# Patient Record
Sex: FEMALE | Race: WHITE
[De-identification: names, ages, dates, MRNs, and addresses within clinical notes are randomized per-mention and may not be internally consistent; named-entity substitution may affect disease eponyms.]

---

## 2018-03-19 ENCOUNTER — HOSPITAL ENCOUNTER (EMERGENCY)
Dept: HOSPITAL 43 - DL.ED | Age: 68
Discharge: HOME | End: 2018-03-19
Payer: MEDICARE

## 2018-03-19 VITALS — DIASTOLIC BLOOD PRESSURE: 89 MMHG | SYSTOLIC BLOOD PRESSURE: 138 MMHG
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
## 2018-03-19 DIAGNOSIS — F17.210: ICD-10-CM

## 2018-03-19 DIAGNOSIS — Z79.82: ICD-10-CM

## 2018-03-19 DIAGNOSIS — S20.212A: Primary | ICD-10-CM

## 2018-03-19 DIAGNOSIS — Z88.1: ICD-10-CM

## 2018-03-19 DIAGNOSIS — S50.02XA: ICD-10-CM

## 2018-03-19 DIAGNOSIS — Z79.899: ICD-10-CM

## 2018-03-19 NOTE — CR
Clinical history: 67-year-old female injured fall.

 

Interpretation: Tiny bone spur olecranon process proximal ulna.

 

No sign of left elbow joint effusion, left elbow fracture or dislocation.

## 2018-03-19 NOTE — CR
Clinical history: 67-year-old female injured left chest (ribs) in a fall.

 

Interpretation: 

 

Normal cardiac silhouette without cephalization of vascular flow, signs of alveolar edema or dependen
t effusion.

 

Mild dorsolumbar scoliosis, generalized osteopenia and multilevel disc disease with arthritic spondyl
osis of the dorsal spine.

 

No sign of left rib fracture, underlying lung contusion, ipsilateral dependent pleural effusion or le
ft-sided pneumothorax (BB marker over the seventh rib on the left).

 

No lung mass, hilar lymphadenopathy or focal lobar pneumonia.

 

CONCLUSION: Chronic abnormalities dorsal spine. No left rib fracture. 

No new cardiopulmonary abnormality since 18 May 2009 exam.

## 2018-03-19 NOTE — EDM.PDOC
ED HPI GENERAL MEDICAL PROBLEM





- General


Chief Complaint: Upper Extremity Injury/Pain


Stated Complaint: FELL ON ICE, HURT LEFT ELBOW AND RIBS


Time Seen by Provider: 03/19/18 15:50


Source of Information: Reports: Patient


History Limitations: Reports: No Limitations





- History of Present Illness


INITIAL COMMENTS - FREE TEXT/NARRATIVE: 





This 66 yo female patient reports to the ED due to a ground level fall, pain in 

her left elbow and pain in her left ribs. The patient reports she slipped on 

the ice this morning and hit her elbow. The patient reports she placed a 

bandage over her left elbow, but continues to have some bleeding. The patient 

reports increased left rib pain with breathing, sneezing or coughing. The 

patient reports no similar previous injuries. The patient reports she had no 

loss of consciousness before, during or after the fall. 


Onset: Today


Onset Date: 03/19/18


Duration: Constant


Location: Reports: Chest (left rib), Upper Extremity, Left (left elbow)


Quality: Reports: Ache, Sharp, Stabbing


Severity: Moderate


Improves with: Reports: Rest


Worsens with: Reports: Movement


Context: Reports: Trauma (fall)


Associated Symptoms: Reports: Chest Pain (left rib pain)


  ** Left Flank


Pain Score (Numeric/FACES): 5





- Related Data


 Allergies











Allergy/AdvReac Type Severity Reaction Status Date / Time


 


amoxicillin trihydrate Allergy  Rash Verified 05/31/16 13:29





[From Augmentin]     


 


potassium clavulanate Allergy  Rash Verified 05/31/16 13:29





[From Augmentin]     











Home Meds: 


 Home Meds





Alendronate [Fosamax] 70 mg PO ASDIRECTED 05/31/16 [History]


Aspirin [Low Dose Aspirin EC] 81 mg PO DAILY 05/31/16 [History]


Aspirin/Acetaminophen/Caffeine [Aspirin-Acetaminophen-Caff Tab] 1 tab PO 

ASDIRECTED PRN 05/31/16 [History]


Budesonide/Formoterol [Symbicort 160-4.5 MCG] 2 inh INH DAILY 05/31/16 [History]


Carboxymethylcellulose Sodium [Restore Tears] 1 drop EYEBOTH DAILY 05/31/16 [

History]


DULoxetine HCl [Cymbalta] 60 mg PO DAILY 05/31/16 [History]


Vitamin B Complex [B Complex] 1 tab PO DAILY 05/31/16 [History]


Vitamin E 400 unit PO DAILY 05/31/16 [History]


Krill/Omega-3/Dha/Epa/Lipids [Krill Oil 300 mg Softgel] 1 tab PO DAILY 06/01/16 

[History]











Past Medical History


HEENT History: Reports: Impaired Vision





- Past Surgical History


Female  Surgical History: Reports: Hysterectomy, Tubal Ligation





Social & Family History





- Tobacco Use


Smoking Status *Q: Current Every Day Smoker


Years of Tobacco use: 50


Packs/Tins Daily: 1





- Caffeine Use


Caffeine Use: Reports: Coffee, Soda





Review of Systems





- Review of Systems


Review Of Systems: ROS reveals no pertinent complaints other than HPI.





ED EXAM, GENERAL





- Physical Exam


Exam: See Below


Exam Limited By: No Limitations


General Appearance: Alert, WD/WN, Moderate Distress


Eye Exam: Bilateral Eye: EOMI, Normal Inspection, PERRL


Ears: Normal External Exam, Normal Canal, Hearing Grossly Normal, Normal TMs


Nose: Normal Inspection, Normal Mucosa, No Blood


Throat/Mouth: Normal Inspection, Normal Lips, Normal Teeth, Normal Gums, Normal 

Oropharynx, Normal Voice, No Airway Compromise


Head: Atraumatic, Normocephalic


Neck: Normal Inspection, Supple, Non-Tender, Full Range of Motion


Respiratory/Chest: No Respiratory Distress, Lungs Clear, Normal Breath Sounds, 

No Accessory Muscle Use, Other (left rib tenderness to palpation (mid left 

sided rib pain) )


Cardiovascular: Normal Peripheral Pulses, Regular Rate, Rhythm, No Edema, No 

Gallop, No JVD, No Murmur, No Rub


GI/Abdominal: Normal Bowel Sounds, Soft, Non-Tender, No Organomegaly, No 

Distention, No Abnormal Bruit, No Mass


 (Female) Exam: Deferred


Rectal (Female) Exam: Deferred


Back Exam: Normal Inspection, Full Range of Motion, NT


Extremities: Arm Pain (left elbow pain (abrasion with bleeding controlled)


Neurological: Alert, Oriented, CN II-XII Intact, Normal Cognition, Normal Gait, 

Normal Reflexes, No Motor/Sensory Deficits


Psychiatric: Normal Affect, Normal Mood


Skin Exam: Warm, Dry, Intact, Normal Color, No Rash


Lymphatic: No Adenopathy





Course





- Vital Signs


Last Recorded V/S: 


 Last Vital Signs











Temp  36.6 C   03/19/18 15:04


 


Pulse  76   03/19/18 15:04


 


Resp  16   03/19/18 15:04


 


BP  138/89   03/19/18 15:04


 


Pulse Ox  99   03/19/18 15:04














Departure





- Departure


Time of Disposition: 16:55


Disposition: Home, Self-Care 01


Condition: Fair


Clinical Impression: 


 Fall from ground level





Contusion of rib on left side


Qualifiers:


 Encounter type: initial encounter Qualified Code(s): S20.212A - Contusion of 

left front wall of thorax, initial encounter





Left elbow contusion


Qualifiers:


 Encounter type: initial encounter Qualified Code(s): S50.02XA - Contusion of 

left elbow, initial encounter








- Discharge Information


Instructions:  Rib Contusion, Elbow Contusion, Easy-to-Read


Forms:  ED Department Discharge


Care Plan Goals: 


The patient was advised of the examination and x-ray results during the visit. 

The patient was encouraged to take over the counter medications as directed for 

temporary symptom relief. The patient should rest, ice and elevate her left 

elbow. If the patient has any additional symptoms or concerns, the patient 

should follow-up with her primary care facility or return to the emergency 

department.

## 2018-07-18 ENCOUNTER — HOSPITAL ENCOUNTER (OUTPATIENT)
Dept: HOSPITAL 43 - DL.SDS | Age: 68
Discharge: HOME | End: 2018-07-18
Attending: OPHTHALMOLOGY
Payer: MEDICARE

## 2018-07-18 VITALS — DIASTOLIC BLOOD PRESSURE: 85 MMHG | SYSTOLIC BLOOD PRESSURE: 145 MMHG

## 2018-07-18 DIAGNOSIS — F17.210: ICD-10-CM

## 2018-07-18 DIAGNOSIS — E78.5: ICD-10-CM

## 2018-07-18 DIAGNOSIS — Z79.899: ICD-10-CM

## 2018-07-18 DIAGNOSIS — I10: ICD-10-CM

## 2018-07-18 DIAGNOSIS — F32.9: ICD-10-CM

## 2018-07-18 DIAGNOSIS — H25.812: Primary | ICD-10-CM

## 2018-07-18 DIAGNOSIS — F41.9: ICD-10-CM

## 2018-07-18 DIAGNOSIS — Z88.0: ICD-10-CM

## 2018-07-18 DIAGNOSIS — Z79.82: ICD-10-CM

## 2018-07-18 DIAGNOSIS — J44.9: ICD-10-CM

## 2018-07-18 PROCEDURE — C1780 LENS, INTRAOCULAR (NEW TECH): HCPCS

## 2018-07-18 PROCEDURE — 00142 ANES PX ON EYE LENS SURGERY: CPT

## 2018-07-18 PROCEDURE — 66984 XCAPSL CTRC RMVL W/O ECP: CPT

## 2018-07-18 NOTE — OR
DATE:  07/18/2018

 

PREOPERATIVE DIAGNOSIS:  Visually significant mixed cataract, left eye.

 

POSTOPERATIVE DIAGNOSIS:  Visually significant mixed cataract, left eye.

 

PROCEDURE:  Extracapsular cataract extraction with intraocular lens implant,

left eye.

 

ANESTHESIA:  Topical/local MAC.

 

COMPLICATIONS:  None.

 

INDICATION:  Ms. Barnes was seen in the clinic.  She has complained of

difficulty reading, difficulty seeing road signs, difficulty with glare and

depth perception.  Clinical examination reveals visually significant mixed

cataract.  I explained options; I offered cataract surgery; and I explained

risks including but not limited to infection, retinal detachment, loss of

vision, and need for additional surgery amongst others.  We discussed implant

options.  She has requested a monofocal implant.  She understands that she may

require glasses following surgery.

 

OPERATIVE DESCRIPTION:  After informed consent was obtained and the risks,

benefits, and alternatives were explained, the patient was brought to the

operative suite and topical anesthesia was administered.  The patient was then

prepped and draped in the sterile fashion, and attention was placed on the left

eye.  A sterile lid speculum was placed into the left eye to allow operative

exposure.  A full-thickness paracentesis was made in the temporal portion of the

operative eye.  Preservative-free lidocaine 0.1 mL was injected into the

anterior chamber followed by viscoelastic.  A full-thickness corneal incision

was then made into the anterior chamber.  A bent needle cystotome was used to

create a small nick in the anterior capsule.  The capsulorrhexis forceps was

then used to create a 360-degree curvilinear capsulorrhexis.  The nucleus was

then removed using a phacoemulsification handpiece, and the remaining cortical

material was then removed with irrigation and aspiration handpiece.  Following

removal of the cortical material, the capsular bag was then inspected and noted

to be free of any holes or tears.  Viscoelastic was then injected into the

capsular bag, and the intraocular lens was inserted into the capsular bag.  The

viscoelastic material was then removed from both the anterior and posterior

chambers and from behind the IOL.  The lens and capsular bag were then

reinspected.  The IOL was well centered and the capsular bag intact.  The wound

and paracentesis sites were inspected and hydrated with balanced saline

solution.  Both were found to be self-sealing.  The intraocular pressure was

assessed digitally and found to be within normal range.  A good red reflex was

noted at the completion of the procedure.  No complications occurred during the

operation.  At the completion of the procedure, Maxitrol, Voltaren, and Iopidine

drops were placed into the operative eye.  A sterile eye shield was placed over

the operative eye, and the patient was transported to the postoperative recovery

area having tolerated the procedure well.  Postoperative instructions were given

along with a postoperative appointment.  The patient was advised to call with

any questions or concerns.

 

DD:  07/18/2018 09:51:29

DT:  07/18/2018 09:59:39

[facility]

Job #:  [id number]

## 2018-07-25 ENCOUNTER — HOSPITAL ENCOUNTER (OUTPATIENT)
Dept: HOSPITAL 43 - DL.SDS | Age: 68
Discharge: HOME | End: 2018-07-25
Attending: OPHTHALMOLOGY
Payer: MEDICARE

## 2018-07-25 VITALS — SYSTOLIC BLOOD PRESSURE: 121 MMHG | DIASTOLIC BLOOD PRESSURE: 75 MMHG

## 2018-07-25 DIAGNOSIS — F41.1: ICD-10-CM

## 2018-07-25 DIAGNOSIS — I10: ICD-10-CM

## 2018-07-25 DIAGNOSIS — F32.9: ICD-10-CM

## 2018-07-25 DIAGNOSIS — H25.811: Primary | ICD-10-CM

## 2018-07-25 DIAGNOSIS — E78.5: ICD-10-CM

## 2018-07-25 DIAGNOSIS — Z88.0: ICD-10-CM

## 2018-07-25 DIAGNOSIS — F17.210: ICD-10-CM

## 2018-07-25 DIAGNOSIS — Z79.82: ICD-10-CM

## 2018-07-25 DIAGNOSIS — J44.9: ICD-10-CM

## 2018-07-25 PROCEDURE — 00142 ANES PX ON EYE LENS SURGERY: CPT

## 2018-07-25 PROCEDURE — 66984 XCAPSL CTRC RMVL W/O ECP: CPT

## 2018-07-25 PROCEDURE — C1780 LENS, INTRAOCULAR (NEW TECH): HCPCS

## 2018-07-25 NOTE — OR
DATE:  07/25/2018

 

PREOPERATIVE DIAGNOSIS:  Visually significant mixed cataract, right eye.

 

POSTOPERATIVE DIAGNOSIS:  Visually significant mixed cataract, right eye.

 

PROCEDURE:  Extracapsular cataract extraction with intraocular lens implant,

right eye.

 

ANESTHESIA:  Topical/local MAC.

 

COMPLICATIONS:  None.

 

INDICATION:  Ms. Barnes was seen in the clinic.  She is unhappy with her

vision.  She complains of difficulty reading, difficulty road signs, and

difficulty with glare.  Her clinical examination reveals visually significant

mixed cataract.  I explained options; I offered cataract surgery; and I

explained risks including but not limited to infection, retinal detachment, loss

of vision, and need for additional surgery.  We discussed implant options.  She

has requested a monofocal implant.  She is comfortable wearing glasses following

surgery if necessary.

 

OPERATIVE DESCRIPTION:  After informed consent was obtained and the risks,

benefits, and alternatives were explained, the patient was brought to the

operative suite and topical anesthesia was administered.  The patient was then

prepped and draped in the sterile fashion, and attention was placed on the right

eye.  A sterile lid speculum was placed into the right eye to allow operative

exposure.  A full-thickness paracentesis was made in the temporal portion of the

operative eye.  Preservative-free lidocaine 0.1 mL was injected into the

anterior chamber followed by viscoelastic.  A full-thickness corneal incision

was then made into the anterior chamber.  A bent needle cystotome was used to

create a small nick in the anterior capsule. The capsulorrhexis forceps was then

used to create a 360-degree curvilinear capsulorrhexis.  The nucleus was then

removed using a phacoemulsification handpiece, and the remaining cortical

material was then removed with irrigation and aspiration handpiece.  Following

removal of the cortical material, the capsular bag was then inspected and noted

to be free of any holes or tears.  Viscoelastic was then injected into the

capsular bag and the intraocular lens was inserted into the capsular bag.  The

viscoelastic material was then removed from both the anterior and posterior

chambers and from behind the IOL.  The lens and capsular bag were then

reinspected.  The IOL was well centered and the capsular bag intact.  The wound

and paracentesis sites were inspected and hydrated with balanced saline

solution.  Both were found to be self-sealing.  The intraocular pressure was

assessed digitally and found to be within normal range.  A good red reflex was

noted at the completion of the procedure.  No complications occurred during the

operation.  At the completion of the procedure, Maxitrol, Voltaren, and Iopidine

drops were placed into the operative eye.  A sterile eye shield was placed over

the operative eye, and the patient was transported to the postoperative recovery

area having tolerated the procedure well.  Postoperative instructions were given

along with a postoperative appointment.  The patient was advised to call with

any questions or concerns.

 

DD:  07/25/2018 09:48:49

DT:  07/25/2018 12:21:37

[facility]

Job #:  [id number]

## 2020-10-21 NOTE — PCM.HP
H&P History of Present Illness





- General


Date of Service: 10/21/20


Admit Problem/Dx: 


                           Admission Diagnosis/Problem





Admission Diagnosis/Problem      Acute heart failure








Source of Information: Patient, Provider


History Limitations: Reports: No Limitations





- History of Present Illness


Initial Comments - Free Text/Narative: 





Patient is a 69-year-old female with a medical history of COPD/asthma, 

anxiety/depression, tobacco use disorder who presented with complaints of 

shortness of breath and lateral leg swelling.





Patient had seen her PCP 2 days ago for routine annual visit.  On her way up 

daily to [place] she was the clinic daily, she became short of breath and had to be

put on a wheelchair.  Patient also reported that she had noticed lateral lower 

extremity swelling for the past 2 weeks.  She denies any chest pain, fever, 

nausea, diarrhea, vomiting.  No prior history of heart failure.  She denies any 

exposure to anyone with coronavirus.  She has productive cough usually in the 

morning and that is chronic.  She has been a smoker for over 50 years.





Labs were done at the visit which were remarkable for elevated BNP of 1140 and 

D-dimer over 600.  Patient was recommended to be admitted for evaluation for new

onset heart failure.  Bilateral lower extremity ultrasound was negative for DVT.











- Related Data


Allergies/Adverse Reactions: 


                                    Allergies











Allergy/AdvReac Type Severity Reaction Status Date / Time


 


amoxicillin trihydrate Allergy  Rash Verified 10/21/20 20:12





[From Augmentin]     


 


potassium clavulanate Allergy  Rash Verified 10/21/20 20:12





[From Augmentin]     











Home Medications: 


                                    Home Meds





Alendronate [Fosamax] 70 mg PO ASDIRECTED 05/31/16 [History]


Aspirin [Low Dose Aspirin EC] 81 mg PO DAILY 05/31/16 [History]


Aspirin/Acetaminophen/Caffeine [Aspirin-Acetaminophen-Caff Tab] 1 tab PO 

ASDIRECTED PRN 05/31/16 [History]


Budesonide/Formoterol [Symbicort 160-4.5 MCG] 2 inh INH DAILY 05/31/16 [History]


Carboxymethylcellulose Sodium [Restore Tears] 1 drop EYEBOTH DAILY 05/31/16 

[History]


Vitamin B Complex [B Complex] 1 tab PO DAILY 05/31/16 [History]


Vitamin E 400 unit PO DAILY 05/31/16 [History]


Krill/Omega-3/Dha/Epa/Lipids [Krill Oil 300 mg Softgel] 1 tab PO DAILY 06/01/16 

[History]


Calcium Carb/Magnesium Oxid/D3 [Calcium Magnesium + D] 1 tab PO DAILY 07/17/18 

[History]


Estrogens, Conjugated [Premarin] 0.45 mg PO DAILY 07/17/18 [History]


L.acidoph,Paracasei, B.lactis [Probiotic] 1 cap PO ASDIRECTED 07/17/18 [History]


Methylcellulose [Citrucel] 500 mg PO DAILY 07/17/18 [History]


Venlafaxine [Effexor XR] 150 mg PO DAILY 07/17/18 [History]


Multivitamin-Min/Iron/FA/Vit K [Multi-Day Plus Minerals Tablet] 1 tab PO DAILY 

07/23/18 [History]


Pred-Moxi-Ketor [Cataract Opthalmic Solution] 1 drop EYEBOTH ASDIRECTED 07/24/18

[History]


PARoxetine [Paxil] 10 mg PO DAILY 10/02/20 [History]











Past Medical History


HEENT History: Reports: Cataract, Impaired Vision, Other (See Below)


Other HEENT History: DENTURES


Cardiovascular History: Reports: High Cholesterol, Hypertension


Respiratory History: Reports: Asthma, SOB


Gastrointestinal History: Reports: Colon Polyp, GERD


Genitourinary History: Reports: Urinary Incontinence, Other (See Below)


Other Genitourinary History: URINARY FREQUENCY


OB/GYN History: Reports: Pregnancy, Other (See Below)


Other OB/BYN History: FIBROCYSTIC BREAST.  POSTMENOPAUSAL SYMPTOMS


Musculoskeletal History: Reports: Arthritis, Osteoarthritis


Neurological History: Reports: Headaches, Chronic


Psychiatric History: Reports: Anxiety, Depression


Endocrine/Metabolic History: Reports: Osteopenia


Hematologic History: Reports: None


Immunologic History: Reports: None


Oncologic (Cancer) History: Reports: None


Dermatologic History: Reports: None





- Infectious Disease History


Infectious Disease History: Reports: None





- Past Surgical History


Head Surgeries/Procedures: Reports: None


HEENT Surgical History: Reports: Tonsillectomy


Cardiovascular Surgical History: Reports: None


GI Surgical History: Reports: Colonoscopy, Polypectomy


Female  Surgical History: Reports: Hysterectomy, Tubal Ligation, Other (See 

Below)


Other Female  Surgeries/Procedures: breast cyst aspiration


Musculoskeletal Surgical History: Reports: None





Social & Family History





- Family History


Family Medical History: Noncontributory





- Caffeine Use


Caffeine Use: Reports: Coffee, Soda


Other Caffeine Use: 32 oz daily





H&P Review of Systems





- Review of Systems:


Review Of Systems: See Below


General: Reports: No Symptoms


HEENT: Reports: No Symptoms


Pulmonary: Reports: Shortness of Breath, Cough, Sputum


Cardiovascular: Reports: Dyspnea on Exertion, Edema


Gastrointestinal: Reports: No Symptoms


Genitourinary: Reports: No Symptoms


Musculoskeletal: Reports: No Symptoms


Skin: Reports: No Symptoms


Psychiatric: Reports: No Symptoms


Neurological: Reports: No Symptoms


Hematologic/Lymphatic: Reports: No Symptoms


Immunologic: Reports: No Symptoms





Exam





- Exam


Exam: See Below





- Exam


General: Alert, Oriented, 4


HEENT: PERRLA, Hearing Intact, Mucosa Moist & Pink, Nares Patent, Normal Nasal 

Septum, Posterior Pharynx Clear, Conjunctiva Clear, EOMI, EACs Clear, TMs Clear


Neck: Supple, Trachea Midline, 2


Lungs: Crackles


Cardiovascular: Regular Rate, Regular Rhythm


GI/Abdominal Exam: Normal Bowel Sounds, Soft, Non-Tender, No Organomegaly, No 

Distention, No Abnormal Bruit, No Mass, Pelvis Stable


Back Exam: Normal Inspection, Full Range of Motion, NT


Extremities: Pedal Edema


Skin: Warm, Dry, Intact


Neurological: Cranial Nerves Intact, Reflexes Equal Bilateral


Neuro Extensive - Mental Status: Alert, Oriented x3, Normal Mood/Affect, Normal 

Cognition


Neuro Extensive - Motor, Sensory, Reflexes: CN II-XII Intact, Normal Gait, 

Normal Reflexes


Psychiatric: Alert, Normal Affect, Normal Mood


Problem List Initiated/Reviewed/Updated: Yes


Orders Last 24hrs: 


                               Active Orders 24 hr











 Category Date Time Status


 


 Patient Status [ADT] Routine ADT  10/21/20 19:55 Ordered


 


 Antiembolic Devices [RC] PER UNIT ROUTINE Care  10/21/20 20:02 Ordered


 


 Cardiac Monitoring [RC] CONTINUOUS Care  10/21/20 19:58 Ordered


 


 EKG Documentation Completion [RC] STAT Care  10/21/20 19:55 Ordered


 


 Intake and Output [RC] QSHIFT Care  10/21/20 19:58 Ordered


 


 Oxygen Therapy [RC] PRN Care  10/21/20 19:55 Ordered


 


 RT Aerosol Therapy [RC] ASDIRECTED Care  10/21/20 20:02 Ordered


 


 Up ad Brisa [RC] ASDIRECTED Care  10/21/20 19:55 Ordered


 


 VTE/DVT Education [RC] PER UNIT ROUTINE Care  10/21/20 19:55 Ordered


 


 Vital Signs [RC] Q4H Care  10/21/20 19:55 Ordered


 


 PT Evaluation and Treatment [CONS] Routine Cons  10/21/20 19:55 Ordered


 


 2 Gram Sodium Diet [DIET] Diet  10/21/20 Dinner Ordered


 


 Chest 2V [CR] Routine Exams  10/21/20 19:55 Ordered


 


 Echo Ltd [US] Routine Exams  10/21/20 20:11 Ordered


 


 BASIC METABOLIC PANEL,BMP [CHEM] AM Lab  10/22/20 05:11 Ordered


 


 CBC W/O DIFF,HEMOGRAM [HEME] AM Lab  10/22/20 05:11 Ordered


 


 CORONAVIRUS COVID-19 RAPID [MOLEC] Stat Lab  10/21/20 20:14 Received


 


 MAGNESIUM [CHEM] AM Lab  10/22/20 05:11 Ordered


 


 PHOSPHORUS [CHEM] AM Lab  10/22/20 05:11 Ordered


 


 TROPONIN I [CHEM] Routine Lab  10/21/20 19:55 Ordered


 


 Acetaminophen [TylenoL] Med  10/21/20 19:55 Ordered





 650 mg PO Q4H PRN   


 


 Albuterol [Proventil Neb Soln] Med  10/21/20 19:55 Ordered





 2.5 mg NEB Q2H PRN   


 


 Alendronate [Fosamax] Med  10/21/20 20:15 Ordered





 70 mg PO ASDIRECTED   


 


 Aspirin [Halfprin] Med  10/22/20 09:00 Ordered





 81 mg PO DAILY   


 


 Budesonide/Formoterol Fumarate Med  10/22/20 09:00 Ordered





 2 inh INH DAILY   


 


 Calcium Carb/Magnesium Oxid/D3 [Calcium Magnesium + D] Med  10/22/20 09:00 

Ordered





 1 tab PO DAILY   


 


 Carboxymethylcellulose Sodium [Restore Tears] Med  10/22/20 09:00 Ordered





 1 drop EYEBOTH DAILY   


 


 Estrogens, Conjugated [Premarin] Med  10/22/20 09:00 Ordered





 0.45 mg PO DAILY   


 


 Furosemide [Lasix] Med  10/21/20 21:00 Ordered





 40 mg IVPUSH BID   


 


 Heparin Sodium Med  10/21/20 22:00 Ordered





 5,000 units SUBCUT Q8HR   


 


 L.acidoph,Paracasei, B.lactis [Probiotic] Med  10/21/20 20:15 Ordered





 1 cap PO ASDIRECTED   


 


 Methylcellulose [Citrucel] Med  10/22/20 09:00 Ordered





 500 mg PO DAILY   


 


 Multivitamin-Min/Iron/FA/Vit K [Multi-Day Plus Minerals Med  10/22/20 09:00 

Ordered





 Tablet]   





 1 tab PO DAILY   


 


 Vitamin B Complex [B Complex] Med  10/22/20 09:00 Ordered





 1 tab PO DAILY   


 


 Vitamin E [Vitamin E] Med  10/22/20 09:00 Ordered





 400 unit PO DAILY   


 


 Zolpidem [Ambien] Med  10/21/20 19:55 Ordered





 5 mg PO BEDTIME PRN   


 


 oxyCODONE Med  10/21/20 19:55 Ordered





 5 mg PO Q4H PRN   


 


 Antiembolic Hose [OM.PC] Per Unit Routine Oth  10/21/20 19:59 Ordered


 


 Resuscitation Status Routine Resus Stat  10/21/20 19:55 Ordered








                                Medication Orders





Acetaminophen (Tylenol)  650 mg PO Q4H PRN


   PRN Reason: Pain (Mild 1-3)/fever


Albuterol (Proventil Neb Soln)  2.5 mg NEB Q2H PRN


   PRN Reason: shortness of breath/wheezing


Aspirin (Halfprin)  81 mg PO DAILY DION


Furosemide (Lasix)  40 mg IVPUSH BID DION


Heparin Sodium (Porcine) (Heparin Sodium)  5,000 units SUBCUT Q8HR DION


Methylcellulose (Citrucel)  500 mg PO DAILY DION


Non-Formulary Medication (Alendronate [Fosamax])  70 mg PO ASDIRECTED DION


Non-Formulary Medication (Budesonide/Formoterol Fumarate)  2 inh INH DAILY DION


Non-Formulary Medication (Calcium Carb/Magnesium Oxid/D3 [Calcium Magnesium + 

D])  1 tab PO DAILY DION


Non-Formulary Medication (Carboxymethylcellulose Sodium [Restore Tears])  1 drop

EYEBOTH DAILY DION


Non-Formulary Medication (Estrogens, Conjugated [Premarin])  0.45 mg PO DAILY 

DION


Non-Formulary Medication (L.Acidoph,Paracasei, B.Lactis [Probiotic])  1 cap PO 

ASDIRECTED DION


Non-Formulary Medication (Multivitamin-Min/Iron/Fa/Vit K [Multi-Day Plus 

Minerals Tablet])  1 tab PO DAILY DION


Non-Formulary Medication (Vitamin B Complex [B Complex])  1 tab PO DAILY DION


Non-Formulary Medication (Vitamin E [Vitamin E])  400 unit PO DAILY DION


Oxycodone HCl (Oxycodone)  5 mg PO Q4H PRN


   PRN Reason: Pain (moderate 4-6)


Zolpidem Tartrate (Ambien)  5 mg PO BEDTIME PRN


   PRN Reason: Sleep








Assessment/Plan Comment:: 





Dyspnea on exertion


Acute CHF, new onset


Patient presented with shortness of breath on exertion with elevated BNP of 

1140.  D-dimer also elevated at over 600.  Differential at this moment include 

new onset CHF, pneumonia or pulmonary embolism.  Ultrasound negative for DVT.


Strict I's and O's and daily weights


Obtain cardiac echo


Obtain CTA for PE


Diuresis with Lasix


Obtain troponin level





Asthma/COPD


Continue Pedialyte/nebs





Tobacco use disorder


Counseled on tobacco cessation


As needed nicotine patches





Anxiety/depression


Resume home medications

## 2020-10-21 NOTE — CR
PROCEDURE INFORMATION: 

Exam: XR Chest, 2 Views 

Exam date and time: 10/21/2020 8:16 PM 

Age: 69 years old 

Clinical indication: Shortness of breath 



TECHNIQUE: 

Imaging protocol: XR of the chest 

Views: 2 views. 



COMPARISON: 

CT Chest Abdomen Pelvis w Cont 9/30/2020 10:14 AM 



FINDINGS: 

Lungs: Hyperinflation compatible with obstructive pulmonary disease. Moderate 

central pulmonary venous congestion. 

Pleural space: Small right pleural effusion. 

Heart/Mediastinum: Moderate enlargement of the cardiopericardial silhouette. 

Bones/joints: Unremarkable. 



IMPRESSION: 

1. Enlarged cardiopericardial silhouette with moderate central pulmonary venous 

congestion and small right pleural effusion. Minimal interstitial edema. 

2. Obstructive pulmonary disease.

## 2020-10-22 NOTE — PCM.PN
- General Info


Date of Service: 10/22/20


Admission Dx/Problem (Free Text): 


                           Admission Diagnosis/Problem





Admission Diagnosis/Problem      Acute heart failure








Subjective Update: 





Patient seen and examined today.  Patient walking around with minimal shortness 

of breath.  Leg swelling improved.


Functional Status: Reports: Pain Controlled





- Review of Systems


General: Reports: No Symptoms


HEENT: Reports: No Symptoms


Pulmonary: Reports: No Symptoms


Cardiovascular: Reports: No Symptoms


Gastrointestinal: Reports: No Symptoms


Genitourinary: Reports: No Symptoms


Musculoskeletal: Reports: No Symptoms


Skin: Reports: No Symptoms


Neurological: Reports: No Symptoms


Psychiatric: Reports: No Symptoms





- Patient Data


Vitals - Most Recent: 


                                Last Vital Signs











Temp  98.9 F   10/22/20 08:00


 


Pulse  73   10/22/20 08:00


 


Resp  18   10/22/20 08:00


 


BP  111/70   10/22/20 08:00


 


Pulse Ox  94 L  10/22/20 08:00











Weight - Most Recent: 115 lb 9 oz


I&O - Last 24 Hours: 


                                 Intake & Output











 10/21/20 10/22/20 10/22/20





 22:59 06:59 14:59


 


Intake Total   60


 


Output Total 100 1400 


 


Balance -100 -1400 60











Lab Results Last 24 Hours: 


                         Laboratory Results - last 24 hr











  10/21/20 10/21/20 10/22/20 Range/Units





  20:14 21:16 06:05 


 


WBC    6.1  (5.0-10.0)  10^3/uL


 


RBC    3.84 L  (4.2-5.4)  10^6/uL


 


Hgb    12.5  (12.0-16.0)  g/dL


 


Hct    38.1  (37.0-47.0)  %


 


MCV    99.2  ()  fL


 


MCH    32.6  (27.0-34.0)  pg


 


MCHC    32.8 L  (33.0-35.0)  g/dL


 


Plt Count    208  (150-450)  10^3/uL


 


Sodium     (136-145)  mmol/L


 


Potassium     (3.5-5.1)  mmol/L


 


Chloride     ()  mmol/L


 


Carbon Dioxide     (21-32)  mmol/L


 


Anion Gap     (7-13)  mEq/L


 


BUN     (7-18)  mg/dL


 


Creatinine     (0.55-1.02)  mg/dL


 


Est Cr Clr Drug Dosing     mL/min


 


Estimated GFR (MDRD)     


 


Glucose     (74-99)  mg/dL


 


Calcium     (8.5-10.1)  mg/dL


 


Phosphorus     (2.6-4.7)  mg/dL


 


Magnesium     (1.8-2.4)  mg/dL


 


Troponin I   0.039   (0.000-0.056)  ng/mL


 


SARS CoV-2 RNA Rapid JOEY  Negative    (NEGATIVE)  














  10/22/20 Range/Units





  06:05 


 


WBC   (5.0-10.0)  10^3/uL


 


RBC   (4.2-5.4)  10^6/uL


 


Hgb   (12.0-16.0)  g/dL


 


Hct   (37.0-47.0)  %


 


MCV   ()  fL


 


MCH   (27.0-34.0)  pg


 


MCHC   (33.0-35.0)  g/dL


 


Plt Count   (150-450)  10^3/uL


 


Sodium  142  (136-145)  mmol/L


 


Potassium  3.0 L  (3.5-5.1)  mmol/L


 


Chloride  102  ()  mmol/L


 


Carbon Dioxide  35 H  (21-32)  mmol/L


 


Anion Gap  8.0  (7-13)  mEq/L


 


BUN  8  (7-18)  mg/dL


 


Creatinine  0.70  (0.55-1.02)  mg/dL


 


Est Cr Clr Drug Dosing  62.77  mL/min


 


Estimated GFR (MDRD)  > 60  


 


Glucose  93  (74-99)  mg/dL


 


Calcium  9.1  (8.5-10.1)  mg/dL


 


Phosphorus  3.8  (2.6-4.7)  mg/dL


 


Magnesium  1.6 L  (1.8-2.4)  mg/dL


 


Troponin I  0.052  (0.000-0.056)  ng/mL


 


SARS CoV-2 RNA Rapid JOEY   (NEGATIVE)  











Med Orders - Current: 


                               Current Medications





Acetaminophen (Tylenol)  650 mg PO Q4H PRN


   PRN Reason: Pain (Mild 1-3)/fever


   Last Admin: 10/22/20 01:52 Dose:  650 mg


   Documented by: 


Albuterol (Proventil Neb Soln)  2.5 mg NEB Q2H PRN


   PRN Reason: shortness of breath/wheezing


Artificial Tears (Refresh Celluvisc)  1 each EYEBOTH DAILY [facility]


   Last Admin: 10/22/20 08:55 Dose:  1 each


   Documented by: 


Aspirin (Halfprin)  81 mg PO DAILY [facility]


   Last Admin: 10/22/20 08:55 Dose:  81 mg


   Documented by: 


Calcium Carbonate (Calcium Carbonate/Vitamin D 1250 Mg-200 Unit)  1 tab PO DAILY

[facility]


   Last Admin: 10/22/20 08:55 Dose:  1 tab


   Documented by: 


Furosemide (Lasix)  20 mg IVPUSH DAILY [facility]


Heparin Sodium (Porcine) (Heparin Sodium)  5,000 units SUBCUT Q8HR [facility]


   Last Admin: 10/22/20 05:52 Dose:  Not Given


   Documented by: 


Magnesium Sulfate 2 gm/ Premix  50 mls @ 25 mls/hr IV ONETIME ONE


   Stop: 10/22/20 11:59


   Last Admin: 10/22/20 10:24 Dose:  25 mls/hr


   Documented by: 


Potassium Chloride 10 meq/ (Premix)  100 mls @ 100 mls/hr IV Q1H [facility]


   Stop: 10/22/20 14:59


Methylcellulose (Citrucel)  500 mg PO DAILY [facility]


   Last Admin: 10/22/20 08:55 Dose:  500 mg


   Documented by: 


Miscellaneous Information (Check Patch)  1 ea TRDERM BEDTIME [facility]


Mometasone Furoate/Formoterol Fumar (Dulera 200-5 Mcg)  2 puff IH BID [facility]


   Last Admin: 10/22/20 08:56 Dose:  2 puff


   Documented by: 


Multivitamins/Minerals (Vitamins And Minerals)  1 tab PO DAILY [facility]


   Last Admin: 10/22/20 08:54 Dose:  1 tab


   Documented by: 


Nicotine (Habitrol)  14 mg TRDERM DAILY [facility]


Oxycodone HCl (Oxycodone)  5 mg PO Q4H PRN


   PRN Reason: Pain (moderate 4-6)


Potassium Chloride (Klor-Con 10)  40 meq PO BIDMEALS [facility]


   Last Admin: 10/22/20 10:25 Dose:  40 meq


   Documented by: 


Vitamin B Complex (Vitamin B Complex)  1 each PO DAILY [facility]


   Last Admin: 10/22/20 08:55 Dose:  1 each


   Documented by: 


Vitamin E (Vitamin E)  400 units PO DAILY [facility]


   Last Admin: 10/22/20 08:55 Dose:  400 units


   Documented by: 


Zolpidem Tartrate (Ambien)  5 mg PO BEDTIME PRN


   PRN Reason: Sleep





Discontinued Medications





Alendronate Sodium (Fosamax)  70 mg PO ASDIRECTED [facility]


Furosemide (Lasix)  40 mg IVPUSH BID [facility]


Furosemide (Lasix)  20 mg IVPUSH DAILY [facility]


   Last Admin: 10/22/20 08:55 Dose:  20 mg


   Documented by: 


Potassium Chloride 10 meq/ (Premix)  100 mls @ 100 mls/hr IV Q1H [facility]


   Stop: 10/22/20 13:59


Iopamidol (Isovue-370 (76%))  100 ml IVPUSH ONETIME ONE


   Stop: 10/21/20 23:11


   Last Admin: 10/21/20 22:59 Dose:  75 ml


   Documented by: 


Non-Formulary Medication (L.Acidoph,Paracasei, B.Lactis [Probiotic])  1 cap PO 

ASDIRECTED DION











- Exam


General: Alert, Oriented


HEENT: Pupils Equal, Pupils Reactive, EOMI, Mucous Membr. Moist/Pink


Neck: Supple


Lungs: Clear to Auscultation, Normal Respiratory Effort


Cardiovascular: Regular Rate, Regular Rhythm


GI/Abdominal Exam: Normal Bowel Sounds, Soft, Non-Tender, No Organomegaly, No 

Distention, No Abnormal Bruit, No Mass, Pelvis Stable


Back Exam: Normal Inspection, Full Range of Motion


Extremities: Normal Inspection, Normal Range of Motion, Non-Tender, No Pedal 

Edema, Normal Capillary Refill


Skin: Warm, Dry, Intact


Neurological: No New Focal Deficit


Psy/Mental Status: Alert, Normal Affect, Normal Mood





Sepsis Event Note





- Evaluation


Sepsis Screening Result: No Definite Risk





- Focused Exam


Vital Signs: 


                                   Vital Signs











  Temp Pulse Resp BP Pulse Ox


 


 10/22/20 08:00  98.9 F  73  18  111/70  94 L














- Problem List Review


Problem List Initiated/Reviewed/Updated: Yes





- My Orders


Last 24 Hours: 


My Active Orders





10/21/20 Dinner


2 Gram Sodium Diet [DIET] 





10/21/20 19:55


Patient Status [ADT] Routine 


Oxygen Therapy [RC] PRN 


Up ad Brisa [RC] ASDIRECTED 


VTE/DVT Education [RC] PER UNIT ROUTINE 


Vital Signs [RC] 00,04,08,12,16,20 


PT Evaluation and Treatment [CONS] Routine 


Acetaminophen [TylenoL]   650 mg PO Q4H PRN 


Albuterol [Proventil Neb Soln]   2.5 mg NEB Q2H PRN 


Zolpidem [Ambien]   5 mg PO BEDTIME PRN 


oxyCODONE   5 mg PO Q4H PRN 


Resuscitation Status Routine 





10/21/20 19:58


Cardiac Monitoring [RC] 08,20 


Intake and Output [RC] QSHIFT 





10/21/20 19:59


Antiembolic Hose [OM.PC] Per Unit Routine 





10/21/20 20:02


Antiembolic Devices [RC] PER UNIT ROUTINE 


RT Aerosol Therapy [RC] ASDIRECTED 





10/21/20 21:15


Mometasone/Formoterol [Dulera 200-5 MCG]   2 puff IH BID 





10/21/20 22:00


Heparin Sodium   5,000 units SUBCUT Q8HR 





10/22/20 09:00


Aspirin [Halfprin]   81 mg PO DAILY 


Calcium Carbonate/Vitamin D3 [Calcium Carbonate/Vitamin D 1250 MG-200 Unit]   1 

tab PO DAILY 


Carboxymethylcellulose Sodium [Refresh Celluvisc]   1 each EYEBOTH DAILY 


Methylcellulose [Citrucel]   500 mg PO DAILY 


Multivitamins/Minerals [Vitamins and Minerals]   1 tab PO DAILY 


Vitamin B Complex   1 each PO DAILY 


Vitamin E (dl, acetate) [Vitamin E]   400 units PO DAILY 





10/22/20 10:00


Magnesium Sulfate/Water [Magnesium Sulfate in Water Premix] 2 gm   Premix Bag 1 

bag IV ONETIME 


Potassium Chloride [Klor-Con 10]   40 meq PO BIDMEALS 





10/22/20 10:30


Nicotine [Habitrol]   14 mg TRDERM DAILY 





10/22/20 11:00


Potassium Chloride [KCl 10 MEQ in Water 100 ML] 10 meq   Premix Bag 1 bag IV Q1H







10/22/20 15:00


Echo [employer] [US] Routine 





10/22/20 21:00


Check Patch   1 ea TRDERM BEDTIME 





10/23/20 05:11


BMP [BASIC METABOLIC PANEL,BMP] [CHEM] AM 





10/23/20 09:00


Furosemide [Lasix]   20 mg IVPUSH DAILY 





10/24/20 05:11


BMP [BASIC METABOLIC PANEL,BMP] [CHEM] AM 





10/25/20 05:11


BMP [BASIC METABOLIC PANEL,BMP] [CHEM] AM 














- Plan


Plan:: 





Dyspnea on exertion


Acute CHF, new onset


Patient presented with shortness of breath on exertion with elevated BNP of 

1140.  D-dimer also elevated at over 600.  Differential at this moment include 

new onset CHF, pneumonia or pulmonary embolism.  Ultrasound negative for DVT.  

CT for PE was negative but showed cardiomegaly.  Serial troponin negative.


Strict I's and O's and daily weights


Obtain cardiac echo


Diuresis with Lasix





Pulmonary nodule


9 mm left lower lobe pulmonary nodule seen on CT scan


Follow-up in 3 months with repeat CT





Asthma/COPD


Continue Pedialyte/nebs





Tobacco use disorder


Counseled on tobacco cessation


As needed nicotine patches





Anxiety/depression


Resume home medications





Hypokalemia


Potassium of 3.0


Replaced

## 2020-10-22 NOTE — CT
PROCEDURE INFORMATION: 

Exam: CT Chest With Contrast 

Exam date and time: 10/21/2020 10:43 PM 

Age: 69 years old 

Clinical indication: Shortness of breath; Additional info: Suspected pulmonary 

embolism 



TECHNIQUE: 

Imaging protocol: Computed tomography of the chest with intravenous contrast. 

Radiation optimization: All CT scans at this facility use at least one of these 

dose optimization techniques: automated exposure control; mA and/or kV 

adjustment per patient size (includes targeted exams where dose is matched to 

clinical indication); or iterative reconstruction. 

Contrast material: [id number]; Contrast volume: 75 ml; Contrast route: 

INTRAVENOUS (IV);  



COMPARISON: 

CT Chest Abdomen Pelvis w Cont 9/30/2020 10:14 AM 



FINDINGS: 

Lungs: There are scattered linear densities in both lungs which are likely 

fibrotic or atelectatic. Mild bilateral peribronchial thickening.Moderate 

diffuse emphysematous changes are present. 9 mm nodule right lower lobe is 

changed little, follow-up intervals extremely short. A small amount of patchy 

nodular infiltrate is identified in the posterior right upper lobe, this area 

was clear previously. 

Pleural space: There is a small right pleural effusion. 

Heart: There is severe cardiomegaly. The left ventricle is particularly 

prominent. 

Pulmonary arteries: There are no pulmonary arterial filling defects identified. 

The main pulmonary artery is dilated being greater in diameter than the 

ascending aorta. Consider pulmonary arterial hypertension. 

Aorta: There is no thoracic aortic aneurysm. 

Lymph nodes: The 16 mm AP window lymph node. There prominent/mildly enlarged 

hilar lymph nodes bilaterally. 



Bones/joints: No acute bony findings are identified. 

Soft tissues: No acute soft tissue abnormalities are identified. 



IMPRESSION: 

1. There is no CT evidence of pulmonary embolism. 

2. Consider pulmonary arterial hypertension. 

3. Severe cardiomegaly. 

4. An element of pulmonary venous hypertension may well be present. Overt 

interstitial edema is not seen. 

5. Small amount of patchy nodular infiltrate posterior right upper lobe may be 

inflammatory. 

6. Mediastinal hilar adenopathy may be reactive or malignant. 

7. 9 mm left lower lobe nodule.For both low risk and high risk patients, 

consider CT Chest at 3 months, PET/CT, or biopsy. (Reference: [place]) 



REFERENCES: 

[place] PUGA et al. Guidelines for Management of Incidental Pulmonary Nodules 

Detected on CT Images: From the Fleischner Society 2017. Radiology. 

2017;284(1):228-243.

## 2020-10-23 NOTE — PCM.DCSUM1
**Discharge Summary





- Hospital Course


Free Text/Narrative:: 





Patient is a 69-year-old female with a medical history of COPD/asthma, 

anxiety/depression, tobacco use disorder who presented with complaints of 

shortness of breath and lateral leg swelling. Labs showed elevated BNP of 1140 

and D-dimer over 600. Bilateral lower extremity ultrasound was negative for DVT 

and CT scan was also negative for PE.  Serial troponin was negative.  However Ct

showed a 9 mm LLL nodule. Patient's edema and shortness of breath resolved with 

diuresis. 





Problems addressed during this hospitalization.





Acute combined systolic and diastolic CHF, new onset


Moderate pulmonary hypertension


TTE showed EF of 25-30% with severe global hypokinesis, Grade II diastolic 

dysfunction and RVSP of 50 mmHg.


There was also LV trabeculations with recommendation for cardiac MRI for further

evaluation.


Follow-up with cardiology


Started on lisinopril, carvedilol and Lasix





Pulmonary nodule


9 mm left lower lobe pulmonary nodule seen on CT scan


Follow-up in 3 months with repeat CT





Asthma/COPD


Continue nebs





Tobacco use disorder


Counseled on tobacco cessation





Anxiety/depression


Resume home medications





Hypokalemia


Replaced








Diagnosis: Stroke: No





- Discharge Data


Discharge Date: 10/23/20


Discharge Disposition: Home, Self-Care 01


Condition: Good





- Referral to Home Health


Primary Care Physician: 


PCP None








- Patient Summary/Data


Consults: 


                                  Consultations





10/21/20 19:55


PT Evaluation and Treatment [CONS] Routine 














- Patient Instructions


Diet: Low Sodium





- Discharge Plan


*PRESCRIPTION DRUG MONITORING PROGRAM REVIEWED*: Not Applicable


*COPY OF PRESCRIPTION DRUG MONITORING REPORT IN PATIENT GERMÁN: Not Applicable


Prescriptions/Med Rec: 


carvediloL [Carvedilol] 3.125 mg PO BID 30 Days  tablet


Furosemide [Lasix] 20 mg PO DAILY PRN #30 tab


 PRN Reason: Edema


lisinopriL [Lisinopril] 2.5 mg PO DAILY #30 tablet


Home Medications: 


                                    Home Meds





Aspirin [Low Dose Aspirin EC] 81 mg PO DAILY 05/31/16 [History]


Aspirin/Acetaminophen/Caffeine [Extra Pain Relief Caplet] 1 tab PO ASDIRECTED 

PRN 05/31/16 [History]


Carboxymethylcellulose Sodium [Restore Tears] 1 drop EYEBOTH DAILY 05/31/16 

[History]


Vitamin B Complex [B Complex] 1 tab PO DAILY 05/31/16 [History]


Calcium Carb/Magnesium Oxid/D3 [Calcium Magnesium + D] 1 tab PO DAILY 07/17/18 

[History]


Methylcellulose [Citrucel] 500 mg PO DAILY 07/17/18 [History]


Multivitamin-Min/Iron/FA/Vit K [Multi-Day Plus Minerals Tablet] 1 tab PO DAILY 

07/23/18 [History]


Pred-Moxi-Ketor [Cataract Opthalmic Solution] 1 drop EYEBOTH ASDIRECTED 07/24/18

[History]


PARoxetine [Paxil] 10 mg PO DAILY 10/02/20 [History]


Albuterol Sulfate [Albuterol Sulfate Hfa] 2 inhalation PO BID 10/21/20 [History]


Budesonide/Formoterol [Symbicort 160-4.5 MCG] 1 inhalation PO DAILY 10/21/20 

[History]


atorvaSTATin [Lipitor] 20 mg PO DAILY 10/21/20 [History]


Furosemide [Lasix] 20 mg PO DAILY PRN #30 tab 10/23/20 [Rx]


carvediloL [Carvedilol] 3.125 mg PO BID 30 Days  tablet 10/23/20 [Rx]


lisinopriL [Lisinopril] 2.5 mg PO DAILY #30 tablet 10/23/20 [Rx]








Oxygen Therapy Mode: Room Air





- Discharge Summary/Plan Comment


DC Time >30 min.: Yes





- General Info


Date of Service: 10/23/20


Admission Dx/Problem (Free Text: 


                           Admission Diagnosis/Problem





Admission Diagnosis/Problem      Acute heart failure








Subjective Update: 





Patient seen and examined today.  Patient walking around with no shortness of br

eath.  Leg swelling resolved.


Functional Status: Reports: Pain Controlled





- Review of Systems


General: Reports: No Symptoms


HEENT: Reports: No Symptoms


Pulmonary: Reports: No Symptoms


Cardiovascular: Reports: No Symptoms


Gastrointestinal: Reports: No Symptoms


Genitourinary: Reports: No Symptoms


Musculoskeletal: Reports: No Symptoms


Skin: Reports: No Symptoms


Neurological: Reports: No Symptoms


Psychiatric: Reports: No Symptoms





- Patient Data


Vitals - Most Recent: 


                                Last Vital Signs











Temp  97.8 F   10/23/20 12:00


 


Pulse  89   10/23/20 12:00


 


Resp  20   10/23/20 12:00


 


BP  131/86   10/23/20 12:00


 


Pulse Ox  99   10/23/20 12:00











Weight - Most Recent: 115 lb 9 oz


I&O - Last 24 hours: 


                                 Intake & Output











 10/23/20 10/23/20 10/23/20





 06:59 14:59 22:59


 


Intake Total 150 1400 


 


Output Total 1300 2400 


 


Balance -1150 -1000 











Lab Results - Last 24 hrs: 


                         Laboratory Results - last 24 hr











  10/23/20 Range/Units





  06:05 


 


Sodium  141  (136-145)  mmol/L


 


Potassium  4.1  (3.5-5.1)  mmol/L


 


Chloride  104  ()  mmol/L


 


Carbon Dioxide  32  (21-32)  mmol/L


 


Anion Gap  9.1  (7-13)  mEq/L


 


BUN  11  (7-18)  mg/dL


 


Creatinine  0.74  (0.55-1.02)  mg/dL


 


Est Cr Clr Drug Dosing  59.37  mL/min


 


Estimated GFR (MDRD)  > 60  


 


Glucose  93  (74-99)  mg/dL


 


Calcium  9.2  (8.5-10.1)  mg/dL











Med Orders - Current: 


                               Current Medications





Acetaminophen (Tylenol)  650 mg PO Q4H PRN


   PRN Reason: Pain (Mild 1-3)/fever


   Last Admin: 10/22/20 14:07 Dose:  650 mg


   Documented by: 


Albuterol (Proventil Neb Soln)  2.5 mg NEB Q2H PRN


   PRN Reason: shortness of breath/wheezing


Artificial Tears (Refresh Celluvisc)  1 each EYEBOTH DAILY Replaced by [facility]


   Last Admin: 10/23/20 08:41 Dose:  1 each


   Documented by: 


Aspirin (Halfprin)  81 mg PO DAILY Replaced by [facility]


   Last Admin: 10/23/20 08:36 Dose:  81 mg


   Documented by: 


Calcium Carbonate (Calcium Carbonate/Vitamin D 1250 Mg-200 Unit)  1 tab PO DAILY

 Replaced by [facility]


   Last Admin: 10/23/20 08:37 Dose:  1 tab


   Documented by: 


Furosemide (Lasix)  20 mg IVPUSH DAILY Replaced by [facility]


   Last Admin: 10/23/20 08:41 Dose:  20 mg


   Documented by: 


Heparin Sodium (Porcine) (Heparin Sodium)  5,000 units SUBCUT Q8HR Replaced by [facility]


   Last Admin: 10/23/20 15:25 Dose:  Not Given


   Documented by: 


Methylcellulose (Citrucel)  500 mg PO DAILY Replaced by [facility]


   Last Admin: 10/23/20 08:36 Dose:  500 mg


   Documented by: 


Miscellaneous Information (Check Patch)  1 ea TRDERM BEDTIME Replaced by [facility]


   Last Admin: 10/22/20 21:30 Dose:  1 ea


   Documented by: 


Mometasone Furoate/Formoterol Fumar (Dulera 200-5 Mcg)  2 puff IH BID Replaced by [facility]


   Last Admin: 10/23/20 08:42 Dose:  2 puff


   Documented by: 


Multivitamins/Minerals (Vitamins And Minerals)  1 tab PO DAILY Replaced by [facility]


   Last Admin: 10/23/20 08:35 Dose:  1 tab


   Documented by: 


Nicotine (Habitrol)  14 mg TRDERM DAILY Replaced by [facility]


   Last Admin: 10/23/20 08:48 Dose:  Not Given


   Documented by: 


Oxycodone HCl (Oxycodone)  5 mg PO Q4H PRN


   PRN Reason: Pain (moderate 4-6)


Paroxetine HCl (Paxil)  10 mg PO DAILY Replaced by [facility]


   Last Admin: 10/23/20 08:37 Dose:  10 mg


   Documented by: 


Potassium Chloride (Klor-Con 10)  40 meq PO BIDMEALS Replaced by [facility]


   Last Admin: 10/23/20 08:38 Dose:  40 meq


   Documented by: 


Vitamin B Complex (Vitamin B Complex)  1 each PO DAILY Replaced by [facility]


   Last Admin: 10/23/20 08:36 Dose:  1 each


   Documented by: 


Vitamin E (Vitamin E)  400 units PO DAILY Replaced by [facility]


   Last Admin: 10/23/20 08:37 Dose:  400 units


   Documented by: 


Zolpidem Tartrate (Ambien)  5 mg PO BEDTIME PRN


   PRN Reason: Sleep





Discontinued Medications





Alendronate Sodium (Fosamax)  70 mg PO ASDIRECTED Replaced by [facility]


Furosemide (Lasix)  40 mg IVPUSH BID Replaced by [facility]


Furosemide (Lasix)  20 mg IVPUSH DAILY Replaced by [facility]


   Last Admin: 10/22/20 08:55 Dose:  20 mg


   Documented by: 


Magnesium Sulfate 2 gm/ Premix  50 mls @ 25 mls/hr IV ONETIME ONE


   Stop: 10/22/20 11:59


   Last Infusion: 10/22/20 12:14 Dose:  Infused


   Documented by: 


Potassium Chloride 10 meq/ (Premix)  100 mls @ 100 mls/hr IV Q1H Replaced by [facility]


   Stop: 10/22/20 16:29


   Last Admin: 10/22/20 14:40 Dose:  Not Given


   Documented by: 


Potassium Chloride 10 meq/ (Premix)  100 mls @ 100 mls/hr IV Q2H Replaced by [facility]


   Stop: 10/22/20 20:29


   Last Admin: 10/22/20 16:33 Dose:  Not Given


   Documented by: 


Potassium Chloride 10 meq/ (Lidocaine HCl 1 ml/ Premix)  101 mls @ 101 mls/hr IV

 Q2H DION


   Stop: 10/22/20 20:59


   Last Admin: 10/22/20 20:42 Dose:  Not Given


   Documented by: 


Iopamidol (Isovue-370 (76%))  100 ml IVPUSH ONETIME ONE


   Stop: 10/21/20 23:11


   Last Admin: 10/21/20 22:59 Dose:  75 ml


   Documented by: 


Non-Formulary Medication (L.Acidoph,Paracasei, B.Lactis [Probiotic])  1 cap PO 

ASDIRECTED DION











- Exam


General: Reports: Alert, Oriented


HEENT: Reports: Pupils Equal, Pupils Reactive, EOMI, Mucous Membr. Moist/Pink


Neck: Reports: Supple


Lungs: Reports: Clear to Auscultation, Normal Respiratory Effort


Cardiovascular: Reports: Regular Rate, Regular Rhythm


GI/Abdominal Exam: Normal Bowel Sounds, Soft, Non-Tender, No Organomegaly, No 

Distention, No Abnormal Bruit, No Mass, Pelvis Stable


Back Exam: Reports: Normal Inspection, Full Range of Motion


Extremities: Normal Inspection, Normal Range of Motion, Non-Tender, No Pedal 

Edema, Normal Capillary Refill


Skin: Reports: Warm, Dry, Intact


Neurological: Reports: No New Focal Deficit


Psy/Mental Status: Reports: Alert, Normal Affect, Normal Mood

## 2021-06-03 NOTE — OR
DATE:  06/03/2021

 

PROCEDURES:  Total colonoscopy, narrow-band imaging, and multiple cold snare

polypectomies.

 

INSTRUMENT USED:  PCF-H190DL Olympus video colonoscope.

 

PREMEDICATIONS:  Fentanyl 100 mcg intravenous, Versed 4 mg intravenous.  Nasal

O2 cannula.

 

The procedure was done under pulse oximetry, BP recording, and cardiac monitor.

 

INDICATION:  The patient with progressive constipation, unexplained and not

responsive to medical measures.  Colonoscopic examination is done for detection

of any polypoid lesions and removal, endoscopic hemostasis therapy if needed.

 

DESCRIPTION OF PROCEDURE:  Initial rectal exam was unremarkable.  Rigid anoscopy

was normal.  The colonoscope was passed with ease.  Diminutive benign-appearing

multiple polyps were noted in the rectosigmoid area, largest one in distal

sigmoid, photograph was taken, cold snare polypectomy was done, the tissue was

retrieved and sent for histopathology.  Few scattered diverticula were noted in

the distal left colon along with deformity.  The scope was passed with ease up

to the ileocecal area.  Photographs were taken of the cecum showing diminutive

benign-appearing polyp, NBI views were obtained, cold snare polypectomy was

done, the tissue was retrieved and sent for histopathology.  No bleeding was

noted from any of the visualized areas at the commencement of the examination.

The bowel preparation was found to be adequate, [place] scale 3 in all the

regions, total score 9.  No stricture.  No vascular ectasia.  No large isolated

ulcerations seen.  No evidence of diffuse inflammatory bowel disease in the form

of friability, contact bleeding, or ulcerations.  Probing the proximal sides of

folds and flexures using adequate distention and clearing up the stool material,

withdrawal of the scope was made, cecum to rectum time over 6 minutes.  No

bleeding was noted from any of the visualized areas at the completion of

examination.

 

IMPRESSION:

1. Diverticulosis.

2. Diminutive colon polyps.

 

The patient tolerated the procedure well.

 

DD:  06/03/2021 07:05:08

DT:  06/03/2021 07:32:24

[facility]

Job #:  [id number]